# Patient Record
Sex: MALE | Race: WHITE
[De-identification: names, ages, dates, MRNs, and addresses within clinical notes are randomized per-mention and may not be internally consistent; named-entity substitution may affect disease eponyms.]

---

## 2017-05-19 NOTE — PROCEDURE NOTE
Pre-Mod Sedation Assessment


General


Date of Moderate Sedation:


May 19, 2017.


Vital Signs:





 Vital Signs Past 12 Hours








  Date Time  Temp Pulse Resp B/P Pulse Ox O2 Delivery O2 Flow Rate FiO2


 


5/19/17 07:13 36.6 68 16 153/73 98 Room Air  











Review


Cardiovascular:  regular rate, rhythm, no edema


Abdomen:  normal bowel sounds, non tender


Lungs:  chest non-tender, lungs clear


Airway Class:  III





Pre-Sedation Airway Assessment


Oral Cavity:  Dentures


Able to Visualize Vocal Cords:  No


Short Thick Neck:  No


Hx of Sleep Apnea:  No


Smoking Status:  Former Smoker


Mallampati Classification:  Class III


ASA Classification:  Class II





Procedure Planning


Contraindications-for Mod Sed:  None


Yes





Notes








The planned sedation has been discussed with the patient and consent obtained.  

I have


identified the patient, determined the appropriateness of sedation and have 

assessed the


patient immediately prior to the procedure.  





All medicine(s) and interventions are by my order.

## 2017-05-19 NOTE — PROCEDURE NOTE
Post-Mod Sedation Assessment


General


Date of Moderate Sedation


May 19, 2017.


Vital Signs:





 Vital Signs Past 12 Hours








  Date Time  Temp Pulse Resp B/P Pulse Ox O2 Delivery O2 Flow Rate FiO2


 


5/19/17 07:13 36.6 68 16 153/73 98 Room Air  











Review - Discharge Criteria


Vital Signs Stable:  Yes


Alert/Oriented/Conversant:  Yes


Returned to Baseline Mental St:  Yes


Nausea Absent/Minimal:  Yes


Pain/Discomfort/Absent/Minimal:  Yes


Normal/Baseline Respirations:  Yes


Active Bleeding?:  No


Pt Received D/C Instructions:  N/A


Prescriptions Given:  None





Specific Proced. D/C Criteria


Distal Pulses Present (Cardiac:  Yes


Groin site assessed-Card Cath:  N/A


Voided Prior To Discharge:  N/A





Discharged Patients


Adult Escort/Transportation:  Yes

## 2017-05-20 NOTE — DISCHARGE INSTRUCTIONS
Discharge Instructions


Procedure


Procedure Date:


May 20, 2017.


Reason for Visit:


*Dr Sanchez To Do* Abnormal Stress Echo.





Discharge


Discharge Date:


May 20, 2017.


Discharge Diagnosis:


Coronary artery disease


Last Recorded Wt (Kilograms):  86.700





Medications


Restart Stopped Medication(s):


Can resume Metformin in 48 hours.





Anesthesia


Post Anesthesia Instructions:


If you have had IV Sedation:





*  Do not drive today.





*  Do not make important decisions or sign legal documents today.





*  Call surgeon for:


   1.  Temperature elevations greater than 101 degrees F.


   2.  Uncontrollable pain.


   3.  Excessive bleeding.


   4.  Persistent nausea and vomiting.


   5.  Medication intolerance (nausea, vomiting or rash).





*  For nausea and vomiting use only clear liquids such as: tea, soda, bouillon 

until


   nausea subsides, then gradually increase diet as tolerated.





*  If you have any concerns or questions, call your cardiologists office.  If 

physician is 


   unavailable and it is an emergency, call 911 or go to the nearest emergency 

room.





Instructions


Activity Recommendations:  limitations as noted below


Recommended Home Diet:  resume previous diet


Allergies:  


Coded Allergies:  


     No Known Allergies (Unverified , 1/14/13)





Follow Up


Additional Instructions:


ACTIVITY RECOMMENDATIONS:





It is common to feel weak and fatigue for a few days.





*  Do not drive or operate any motorized equipment for the next


    day.





*  Limit stair usage (2 or 3 trips a day only) for the next three days.





*  Do not lift anything heavier than 10 pounds for the next three


    days.





*  Do not engage in vigorous exercise or any sports for the next


    five days.





*  You may shower the day after your procedure, but do not 


    immerse the area for three days.  Cleanse the site gently with


    soap and water.








SPECIAL CARE INSTRUCTIONS:





* You may replace the pressure dressing or band-aid the morning 


after the procedure.





* After your procedure, it is normal to have a small bruise or small lump


at the site.  Examine your site daily for any change in the bruise or lump,


redness, swelling, drainage or numbness.  


Notify your doctor if any change.





BLEEDING:





* If there is a small amount of bleeding at the site, lie down and apply


firm pressure with a clean cloth for ten minutes.  When the bleeding stops,


lie quietly keeping the procedure limb straight for six hours.  


Notify your doctor as soon as possible.





* If the bleeding does not stop after ten minutes or if there is a large


amount of bleeding or spurting, call 911 immediately.  Continue to lie 


down and hold firm pressure until help arrives.





SKIN IRRITATION:





*  You may experience some redness and/or swelling in the area where radiation 

was


administered.  If any skin irritation occurs, please contact your family 

physician.








FOLLOW UP VISIT:





Keep any scheduled doctor appointments.


Follow-up with:


Follow-up with Primary Care next week -- Discuss elevated white blood count and 

possible lung evaluation and/or home oxygen.





Follow-up with Dr. Jaramillo in 2-3 weeks.


Freda Quinteroy Recommendations:


 


Call your doctor if:


*  Temperature above 101 degrees


*  Pain not relieved by pain medicine ordered


*  There is increased drainage or redness from any incision


*  You have any unanswered questions or concerns.





Your Doctors Instructions noted above were prepared by provider Nikunj Sanchez.


Patient Signature Section:


 Patient Instructions Signature Page








Winston Gordon 











Patient (or Guardian) Signature/Date:____________________________________ I 

have read and understand the instructions given to me by my caregivers.








Caregiver/RN/Doctor Signature/Date:____________________________________








The above-named patient and/or guardian has received patient instructions on 

this date.


























+  Original Patient Signature Page (only) stays with chart.  Please make copy 

for patient.

## 2017-05-21 NOTE — CARDIAC CATHETERIZATION
Procedure Note


Procedure Date


May 19, 2017.





Pre-Procedure Diagnosis


Angina, Positive Stress Test, Cardiomyopathy





AUC Score


7





Post-Procedure Diagnosis


Severe CAD, Successful PCI, Normal Intracardiac Pressures





Procedure(s) Performed


Coronary Angiography, Left Heart Cath, Drug Eluting Stent, IVUS





Cardiologist


Dr. Sanchez





Assistant(s)


mir





Estimated Blood Loss


37





Medication(s)


Clopidogrel, Fentanyl, Heparin, Nicardipine, Nitroglycerin, Versed, Lidocaine 1%





Summary of Findings


Indication: Positive stress test





Access: 6Fr Right Radial artery


Catheters: Tiger, JL4, JR4, AR1, AR2, AL1; EBU 3.5 guide





Findings:


LM - Luminal irregularities


LAD -  Calcified, eccentric, 50-60% early-mid LAD stenosis; distal luminal 

irregularities; small to moderate caliber 1st diagonal with 70-80% proximal 

stenosis; small 2nd diagonal with 70-80% ostial stenosis


Circumflex - Dominant, large caliber vessel with 30-40% mid segment stenosis; 

large OM3 with mild proximal disease and 80% focal mid segment stenosis; Distal 

circumflex 80-90% stenosis.


RCA - Poorly visualized despite multiple catheters (best seen with AR1).





LVEDP - 15





IVUS assessment of proximal to mid LAD -- mild to moderately calcified, max 

stenosis 50-60% (minimum CSA 4.5 cm2 in the mid segment).  LAD stenosis felt to 

be intermediate.  





-- PCI --


Antithrombotic therapy: Heparin, Clopidogrel


Procedure:


Left main cannulated with EBU 3.5 guide 


BMW placed into distal LAD for IVUS assessment


Wire pulled back and passed across OM3 lesion into distal vessel


OM3 lesion predilated with 2.5 compliant balloon


Dilated lesion stented with 2.75 x 15 Xience JERRY


Stent post-dilated with 2.75 noncompliant balloon


Wire pulled back and placed across distal circumflex into distal PDA.


Lesion predilated with 2.5 balloon


Distal circumflex stented with 2.75 x 28 Xience JERRY


Stent post-dilated with 3.0 NC balloon.


IC vasodilators administered for spasm


Post procedure JESSY 3 flow, stents well expanded with minimal residual stenosis 

and no apparent cardiac complications.  





Arterial Closure: TR Band





Summary:


1. Severe multivessel coronary artery disease


 - 50-60% calcified mid LAD stenosis (appears moderate by IVUS)


 - 70% proximal 1st diagonal


 - 80% OM3


 - 80-90% distal circumflex


 - Non-dominant RCA poorly visualized





2. Normal intracardiac filling pressure





3. Successful PCI of OM3 with 2.75 x 15 Xience JERRY and distal circumflex with 

2.75 x 28 Xience JERRY





Recommendations:


To PCU for continued monitoring


Loaded with Clopidogrel 300 mg in cath lab


Continue dual-antiplatelet therapy with ASA and plavix


Continue statin, and ASCVD risk factor modification


Consult cardiac Rehab





If continued symptoms suggestive of angina would consider FFR/PCI of LAD, 

possible PCI of diagonal.  


In the future for improved visualization of RCA consider angiography from left 

radial artery/femoral artery.





Hemodynamics


Rest Ao:


117/59/83


Final Ao:


133/55/86


LV:


123/15





Recommendations


PCI without planned CABG





Specimens


None





Radiation Exposure (mGy)


7187





Contrast (mls)


360





Fluids (cc crystalloids)


222





Drains


None





Anesthesia


Moderate





Procedural Complication(s)


None





Disposition


PCU





ACC Data


Cardiac Status


Clinical evaluation leading to the procedure


CAD Presntation:  Positive Stress Test


Anginal Classification:  CCS III


Heart Failure:  No, NYHA Class: CCS I


Cardiogenic Shock w/in 24Hrs:  No


Cardiac Arrest w/in 24Hrs:  No


Imaging studies past 6 months:  Yes


Stress studies past 6 months:  Yes


Standard Exercise Stress Test:  No


Stress Echocardiogram:  Yes - Positive, Risk/Extent of Ischemia (Low)


Stress Testing w/SPECT MPI:  No


Cardiac CTA:  No





Coronary Anatomy


Dominant:  Left


Left Main (% Stenosis):  Normal


LAD (% Stenosis):  Mid (50-60)


D1 (% Stenosis):  Proximal (70)


D2 (% Stenosis):  Ostial (70)


Circumflex (% Stenosis):  Mid (30-40), Distal


OM3 (% Stenosis):  Mid (80)





Diagnostic


Physician's Name:  Jimmy Sanchez MD


Status:  Elective





Closure Device


Percutaneous Entry Location:  Radial


Closure Device:  Radial Band


Recommendations:  PCI without planned CABG


PCI Indication:  + Stress Test





Lesion


Segment Name:  Distal circumflex


Culprit Artery:  Yes


Stenosis Prior to Rx (%):  80


Chronic Total Occlusion:  No


IVUS:  No


FFR:  No


Pre-Procedure JESSY Flow:  3


Previously Treated Lesion:  No


Lesion Complexity:  Non-High/Non-C


Lesion Length (mm):  20


Thrombus Present:  No


Guidewire Across Lesion:  Yes


Guidewire:  


   Stenosis Post-Procedure (%):  0


   Post-Procedure JESSY Flow:  3


   Device(s) Deployed:  Yes





Intraprocedure Events


Significant Dissection:  No


Perforation:  No

## 2017-06-01 NOTE — DISCHARGE SUMMARY
PRINCIPAL DIAGNOSES:  Coronary artery disease.

 

PROCEDURES:

1.  Coronary angiography.

1. IVUS assessment of mid LAD.

2. PCI of OM3 and distal circumflex with 2 drug-eluting stents (2.75 x 15

Xience, 2.75 x 28 Xience).

 

HISTORY OF PRESENT ILLNESS:  Mr. Gordon is a very pleasant 77-year-old man

followed by Dr. Jaramillo for his cardiovascular care as an outpatient.  He

was referred for cardiac catheterization due to decreased exercise tolerance

and dyspnea on exertion occurring over weeks to months.  This was noted in

the setting of his cardiac risk factors including hypertension,

hyperlipidemia, diabetes and a positive stress test which showed poor

functional capacity and possible anterior septal, inferior wall ischemia on

recent stress echo.

 

HOSPITAL COURSE:  The patient was taken to cardiac catheterization lab.  He

underwent  coronary angiography via right radial artery.  He was found to

have a  intermediate lesion that was calcified in his mid LAD along with 70%

proximal first diagonal lesion, 80% OM3 lesion and 80-90% distal circumflex. 

Despite multiple catheters his nondominant RCA was poorly visualized.  Mid

LAD lesion would further evaluate with IVUS and was thought to be most

consistent with borderline disease and decision was made to forgo

intervention on the LAD.  The patient underwent stent placement with 2

drug-eluting stents to his OM3 and  his distal circumflex (OM3 2.75 x 15

Xience, distal circumflex 2.75 x 28 Xience).  The patient tolerated the

procedure well.  He was admitted to telemetry post-procedure for further

observation.  He had no further events on telemetry.  He had no recurrent

chest pain overnight.  In the morning he was feeling at baseline but it was 
noted

that when he walked round the [place] his oxygen levels did drop into the mid 80s. 

Did discuss with patient on  discharge potential for home oxygen but stated

he was not interested.

 

The patient was also noted again to have a significant leukocytosis

on repeat follow-up labs.  This was noted prior to admission.  The patient

stated that he has planned  followup with hematology in the upcoming weeks.  

 

Going forward if the patient would have continued symptoms following

hematology workup consideration could be given to further

evaluation/intervention of proximal mid LAD and first  diagonal.  Would

attempt to better visualize RCA via femoral approach.

 

The patient discharged to home and will follow up with Dr. Jaramillo in 3-4

weeks.

 

DISCHARGE MEDICATIONS:

1. Aspirin 81.

2. Alprazolam 0.25 daily.

3. Atorvastatin 40 mg at bedtime.

4. Carvedilol 3.125 b.i.d.

5. Citalopram 1 tab p.o. daily.

6. Clopidogrel 75 mg daily.

7. Fenofibrate 134 mg p.o. daily.

8. Finasteride 5 mg daily.  

9. Folic acid 1 mg daily.

10. Lisinopril 20 mg daily.

11. Meclizine 25 mg t.i.d.

12. Metformin 1000 mg b.i.d. (to be restarted  48 hours after procedure).

13. Protonix 40 mg daily.

14. Terazosin 5 mg daily.

 

 

 

MTDD

## 2018-02-02 ENCOUNTER — HOSPITAL ENCOUNTER (OUTPATIENT)
Dept: HOSPITAL 45 - C.MRIBC | Age: 78
Discharge: HOME | End: 2018-02-02
Attending: FAMILY MEDICINE
Payer: COMMERCIAL

## 2018-02-02 DIAGNOSIS — R42: Primary | ICD-10-CM

## 2018-02-02 NOTE — DIAGNOSTIC IMAGING REPORT
MRI OF THE BRAIN WITHOUT  CONTRAST



CLINICAL HISTORY: [id number]    



COMPARISON STUDY: None.



FINDINGS:

Sagittal T1, axial diffusion, proton density and T2 weighted axial, coronal

FLAIR, and axial T1-weighted images were acquired. 

No intra or extra-axial mass lesions are visualized

Axial diffusion-weighted images reveal no evidence of acute or subacute

infarction.

There is no evidence of ventricular dilatation.

Proton density T2-weighted and FLAIR images reveal moderate foci of increased T2

signal within the white matter, likely on a small vessel basis.

There are no abnormal flow voids.



There is a 3 cm fat-containing right parotid mass, likely representing a lipoma



There is suspected cervical lymphadenopathy. Correlation with any history of

lymphoma or metastatic disease is recommended. A neck CT could be obtained in

follow-up for confirmation and further evaluation.



IMPRESSION:  

1. No evidence of intracranial mass

2. No evidence of acute or subacute infarction

3. 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

4. Suspected cervical lymphadenopathy. CT scanning of the neck is recommended in

follow-up for confirmation.







Electronically signed by:  Vinnie Sanchez M.D.

2/2/2018 12:56 PM



Dictated Date/Time:  2/2/2018 12:50 PM

## 2018-02-27 ENCOUNTER — HOSPITAL ENCOUNTER (OUTPATIENT)
Dept: HOSPITAL 45 - C.NUCL | Age: 78
Discharge: HOME | End: 2018-02-27
Attending: INTERNAL MEDICINE
Payer: COMMERCIAL

## 2018-02-27 DIAGNOSIS — I25.10: Primary | ICD-10-CM

## 2018-02-27 DIAGNOSIS — I42.9: ICD-10-CM

## 2018-02-27 DIAGNOSIS — R06.09: ICD-10-CM

## 2018-02-27 NOTE — MYOCARDIAL PERFUSION SCAN
NUCLEAR STRESS TEST

 

STUDY REQUESTED BY:  Dr. Luke Jaramillo.

 

ONE-DAY NUCLEAR MEDICINE TECHNETIUM-99M CARDIOLITE MYOCARDIAL PERFUSION SCAN

 

INDICATION:  History of coronary artery disease status post prior PCI with

stenting, ischemic cardiomyopathy, and dyspnea on exertion.

 

ECHOCARDIOGRAM:  Normal sinus rhythm at a ventricular rate of 77.  There was

first degree AV block, nonspecific intraventricular conduction delay, left

axis deviation and questionable anterolateral and inferior prior infarct.

 

STRESS ECHOCARDIOGRAM:  With Lexiscan, there were occasional PVCs.  No

significant ST abnormalities.  Heart rate did not significantly change.

 

TECHNIQUE:  For the stress portion of the study 31.1 mCi of technetium-99m

Cardiolite IV was injected at 13:25 p.m. on 02/27/2018.  Thirty minutes

following injection, imaging of the heart was performed in multiple

projections.  For the rest portion of the study, 11.0 mCi of technetium-99m

Cardiolite was injected IV at 11:30.  One hour following injection, the

imaging of the heart was performed in the same projections.

 

FINDINGS:  Raw images were reviewed in detail.  There was minimal gut uptake.

 There was minimal diaphragmatic attenuation and there was abnormal shoulder

uptake bilaterally but no other significant extracardiac pathologic uptake.

 

The short axis, vertical long axis, horizontal long axis images were reviewed

in detail.  There was a primarily fixed moderate intensity moderate in size

mid inferior, inferolateral perfusion defect also involving the apical

lateral and true apex.  There was corresponding wall motion abnormality with

hypokinesis in the mid inferior, inferolateral and apical lateral segments as

well as true apex.  Overall, EF was preserved with an EF of 50%.  LV size was

normal with an end-diastolic volume of 115.

 

IMPRESSION:

1. Primarily fixed inferolateral, inferior, apical lateral and apical perfusion 
defect, 

most consistent with RCA/Circumflex distribution infract.

2. No significant Lexiscan-induced ischemia.

3. Normal left ventricular size.  Normal overall left ventricular function

with an ejection fraction of 50%.  There was inferior, inferolateral regional

wall motion abnormalities, corresponding with perfusion defect.

4. Nondiagnostic Lexiscan ECG due to inability to reach target heart rate.

 

 

 

MTDD

## 2018-02-27 NOTE — ECHOCARDIOGRAM REPORT
*NOTICE TO RECEIVING PARTY AGENCY**  This information is strictly Confidential and protected under 
[place] law.  [place] law prohibits you from making any further disclosure of this 
information unless further disclosure is expressly permitted by the written consent of the person to 
whom it pertains or is authorized by law.  A general authorization for the release of medical or 
other information is not sufficient for this purpose.  Hospital accepts no responsibility if the 
information is made available to any other person, INCLUDING THE PATIENT.



Interpretation Summary

  *  Name: QUINN BECKHAM  Study Date: 2018 02:29 PM

  *  MRN: [id number]  Patient Location: [facility]  HR: 76

  *  : 1940 (M/[birth year]/yyyy)  Gender: Male  Height: 70 in

  *  Age: 77 yrs  Ethnicity: [place]  Weight: 206 lb

  *  Ordering Physician: Luke Jaramillo

  *  Referring Physician: Luke Jaramillo.

  *  Performed By: Eladia Hope RDCS

  *  Accession# [id number]  Account# [id number]

  *  Reason For Study: Arteriosclerosis of coronary artery (414.00)' Cardiomyopathy (425.4); Dyspnea 
on Exertion (786.09).

  *  BSA: 2.1 m2

  *  -- Conclusions --

  *  1. Normal LV size.  Borderline concentric LVH.

  *  2. Mild LV dysfunction.  LVEF 40-45%.  Inferior, inferolateral akinesis.  Abnormal septal 
motion consistent with conduction delay.

  *  3. Grade I diastolic dysfunction.

  *  4. Normal RV size and function.

  *  5. Aortic valve sclerosis without stenosis.

  *  6. Normal estimated RA and PA pressures.

  *  7. No prior studies for comparison.

Procedure Details

  *  A complete two-dimensional transthoracic echocardiogram was performed (2D, M-mode, Doppler and 
color flow Doppler).

Left Ventricle

  *  The left ventricle is grossly normal size.

  *  There is borderline concentric left ventricular hypertrophy.

  *  Ejection Fraction = 40-45%.

  *  Septal motion is consistent with conduction abnormality.

Right Ventricle

  *  The right ventricle is grossly normal size.

  *  The right ventricular systolic function is normal as assessed by tricuspid annular plane 
systolic excursion (TAPSE) (normal >1.5 cm).

Atria

  *  Borderline left atrial enlargement.

  *  Right atrial size is normal.

  *  No ASD detected; PFO is not assessed.

Mitral Valve

  *  There is mild mitral annular calcification.

  *  There is no mitral valve stenosis.

  *  There is trace mitral regurgitation.

Tricuspid Valve

  *  The tricuspid valve is not well visualized, but is grossly normal.

  *  There is no tricuspid stenosis.

  *  There is trace tricuspid regurgitation.

Aortic Valve

  *  Aortic valve sclerosis mild, without significant aortic valvular stenosis.

  *  The aortic valve is trileaflet.

  *  No hemodynamically significant valvular aortic stenosis.

  *  There is no significant aortic regurgitation.

Pulmonic Valve

  *  The pulmonary valve is inadequately visualized, but the Doppler data is adequate for 
interpretation.

  *  Pulmonic stenosis is absent.

  *  Trace pulmonic valvular regurgitation.

Great Vessels

  *  The aortic root and proximal ascending aorta are normal sized.

Pericardium/Pleural

  *  There is no pericardial effusion.

Great Vessels

  *  There is no evidence of pulmonary hypertension. The PA systolic pressure is less than 36 mmHg.

  *  Normal inferior vena cava size and collapsability with sniff indicates a normal right atrial 
pressure of 3 mmHg

Left Ventricular Diastolic Function

  *  Grade I diastolic dysfunction, (abnormal relaxation pattern).



MMode 2D Measurements and Calculations

IVSd 1.1 cm

IVSs 1.8 cm



LVIDd 5.1 cm

LVIDs 4.1 cm

LVPWd 1.8 cm

LVPWs 2.0 cm



IVS/LVPW 0.61 

FS 21.1 %

EDV(Teich) 126.0 ml

ESV(Teich) 72.3 ml

EF(Teich) 42.6 %



EDV(cubed) 135.7 ml

ESV(cubed) 66.7 ml

EF(cubed) 50.9 %

% IVS thick 66.6 %

% LVPW thick 9.4 %





LV mass(C)d 318.6 grams

LV mass(C)dI 150.7 grams/m\S\2

LV mass(C)s 346.4 grams

LV mass(C)sI 163.9 grams/m\S\2



SV(Teich) 53.7 ml

SI(Teich) 25.4 ml/m\S\2

SV(cubed) 69.0 ml

SI(cubed) 32.6 ml/m\S\2



Ao root diam 3.4 cm

Ao root area 9.1 cm\S\2

ACS 1.7 cm

LA dimension 3.8 cm



LA/Ao 1.1 





LVAd ap4 40.0 cm\S\2

LVLd ap4 9.0 cm

EDV(MOD-sp4) 147.6 ml

EDV(sp4-el) 150.6 ml

LVAs ap4 27.5 cm\S\2

LVLs ap4 8.1 cm

ESV(MOD-sp4) 79.1 ml

ESV(sp4-el) 79.0 ml

EF(MOD-sp4) 46.4 %

EF(sp4-el) 47.5 %



LVAd ap2 38.3 cm\S\2

LVLd ap2 9.3 cm

EDV(MOD-sp2) 132.5 ml

EDV(sp2-el) 133.1 ml

LVAs ap2 28.3 cm\S\2

LVLs ap2 8.7 cm

ESV(MOD-sp2) 78.6 ml

ESV(sp2-el) 78.0 ml

EF(MOD-sp2) 40.7 %

EF(sp2-el) 41.4 %



LVLd %diff 3.5 %

EDV(MOD-bp) 141.9 ml

LVLs %diff 7.0 %

ESV(MOD-bp) 79.9 ml

EF(MOD-bp) 43.7 %



SV(MOD-sp4) 68.5 ml

SI(MOD-sp4) 32.4 ml/m\S\2





SV(MOD-sp2) 53.9 ml

SI(MOD-sp2) 25.5 ml/m\S\2



SV(MOD-bp) 61.9 ml

SI(MOD-bp) 29.3 ml/m\S\2



SV(sp4-el) 71.5 ml

SI(sp4-el) 33.8 ml/m\S\2



SV(sp2-el) 55.1 ml

SI(sp2-el) 26.1 ml/m\S\2







Doppler Measurements and Calculations

MV E max adela 57.7 cm/sec

MV A max adela 82.5 cm/sec



MV E/A 0.70 



MV dec time 0.19 sec



Ao V2 max 135.7 cm/sec

Ao max PG 7.4 mmHg

Ao max PG (full) 5.0 mmHg





LV V1 max PG 2.4 mmHg



LV V1 max 77.6 cm/sec



PA V2 max 109.7 cm/sec

PA max PG 4.8 mmHg



PI max adela 126.0 cm/sec

PI max PG 6.3 mmHg

PI dec slope 224.3 cm/sec\S\2

PI P1/2t 164.5 msec





TR max adela 216.1 cm/sec